# Patient Record
Sex: MALE | Race: BLACK OR AFRICAN AMERICAN | Employment: STUDENT | URBAN - METROPOLITAN AREA
[De-identification: names, ages, dates, MRNs, and addresses within clinical notes are randomized per-mention and may not be internally consistent; named-entity substitution may affect disease eponyms.]

---

## 2017-04-17 ENCOUNTER — HOSPITAL ENCOUNTER (EMERGENCY)
Age: 22
Discharge: HOME | End: 2017-04-17
Attending: EMERGENCY MEDICINE
Payer: COMMERCIAL

## 2017-04-17 VITALS
HEART RATE: 71 BPM | RESPIRATION RATE: 16 BRPM | DIASTOLIC BLOOD PRESSURE: 93 MMHG | HEIGHT: 72 IN | OXYGEN SATURATION: 96 % | SYSTOLIC BLOOD PRESSURE: 169 MMHG | TEMPERATURE: 98.8 F

## 2017-04-17 DIAGNOSIS — F32.89 OTHER DEPRESSION: Primary | ICD-10-CM

## 2017-04-17 LAB
AMPHET UR QL SCN: NEGATIVE
ANION GAP BLD CALC-SCNC: 13 MMOL/L (ref 5–15)
APPEARANCE UR: ABNORMAL
BACTERIA URNS QL MICRO: NEGATIVE /HPF
BARBITURATES UR QL SCN: NEGATIVE
BENZODIAZ UR QL: NEGATIVE
BILIRUB UR QL CFM: POSITIVE
BUN SERPL-MCNC: 16 MG/DL (ref 6–20)
BUN/CREAT SERPL: 13 (ref 12–20)
CALCIUM SERPL-MCNC: 9.8 MG/DL (ref 8.5–10.1)
CANNABINOIDS UR QL SCN: NEGATIVE
CHLORIDE SERPL-SCNC: 97 MMOL/L (ref 97–108)
CO2 SERPL-SCNC: 28 MMOL/L (ref 21–32)
COCAINE UR QL SCN: NEGATIVE
COLOR UR: ABNORMAL
CREAT SERPL-MCNC: 1.28 MG/DL (ref 0.7–1.3)
DRUG SCRN COMMENT,DRGCM: NORMAL
EPITH CASTS URNS QL MICRO: ABNORMAL /LPF
ERYTHROCYTE [DISTWIDTH] IN BLOOD BY AUTOMATED COUNT: 12.4 % (ref 11.5–14.5)
ETHANOL SERPL-MCNC: <10 MG/DL
GLUCOSE SERPL-MCNC: 103 MG/DL (ref 65–100)
GLUCOSE UR STRIP.AUTO-MCNC: NEGATIVE MG/DL
HCT VFR BLD AUTO: 47.7 % (ref 36.6–50.3)
HGB BLD-MCNC: 16.1 G/DL (ref 12.1–17)
HGB UR QL STRIP: NEGATIVE
KETONES UR QL STRIP.AUTO: 40 MG/DL
LEUKOCYTE ESTERASE UR QL STRIP.AUTO: NEGATIVE
MCH RBC QN AUTO: 29.8 PG (ref 26–34)
MCHC RBC AUTO-ENTMCNC: 33.8 G/DL (ref 30–36.5)
MCV RBC AUTO: 88.2 FL (ref 80–99)
METHADONE UR QL: NEGATIVE
NITRITE UR QL STRIP.AUTO: NEGATIVE
OPIATES UR QL: NEGATIVE
PCP UR QL: NEGATIVE
PH UR STRIP: 5.5 [PH] (ref 5–8)
PLATELET # BLD AUTO: 276 K/UL (ref 150–400)
POTASSIUM SERPL-SCNC: 4.3 MMOL/L (ref 3.5–5.1)
PROT UR STRIP-MCNC: ABNORMAL MG/DL
RBC # BLD AUTO: 5.41 M/UL (ref 4.1–5.7)
RBC #/AREA URNS HPF: ABNORMAL /HPF (ref 0–5)
SODIUM SERPL-SCNC: 138 MMOL/L (ref 136–145)
SP GR UR REFRACTOMETRY: 1.03 (ref 1–1.03)
UA: UC IF INDICATED,UAUC: ABNORMAL
UROBILINOGEN UR QL STRIP.AUTO: 1 EU/DL (ref 0.2–1)
WBC # BLD AUTO: 7.5 K/UL (ref 4.1–11.1)
WBC URNS QL MICRO: ABNORMAL /HPF (ref 0–4)

## 2017-04-17 PROCEDURE — 81001 URINALYSIS AUTO W/SCOPE: CPT | Performed by: EMERGENCY MEDICINE

## 2017-04-17 PROCEDURE — 80307 DRUG TEST PRSMV CHEM ANLYZR: CPT | Performed by: EMERGENCY MEDICINE

## 2017-04-17 PROCEDURE — 85027 COMPLETE CBC AUTOMATED: CPT | Performed by: EMERGENCY MEDICINE

## 2017-04-17 PROCEDURE — 80048 BASIC METABOLIC PNL TOTAL CA: CPT | Performed by: EMERGENCY MEDICINE

## 2017-04-17 PROCEDURE — 36415 COLL VENOUS BLD VENIPUNCTURE: CPT | Performed by: EMERGENCY MEDICINE

## 2017-04-17 PROCEDURE — 90791 PSYCH DIAGNOSTIC EVALUATION: CPT

## 2017-04-17 PROCEDURE — 99284 EMERGENCY DEPT VISIT MOD MDM: CPT

## 2017-04-17 PROCEDURE — 74011250637 HC RX REV CODE- 250/637: Performed by: EMERGENCY MEDICINE

## 2017-04-17 RX ORDER — DIAZEPAM 5 MG/1
5 TABLET ORAL
Qty: 12 TAB | Refills: 0 | Status: SHIPPED | OUTPATIENT
Start: 2017-04-17

## 2017-04-17 RX ORDER — DIAZEPAM 5 MG/1
5 TABLET ORAL
Status: COMPLETED | OUTPATIENT
Start: 2017-04-17 | End: 2017-04-17

## 2017-04-17 RX ADMIN — DIAZEPAM 5 MG: 5 TABLET ORAL at 05:59

## 2017-04-17 NOTE — ED PROVIDER NOTES
HPI Comments: Lee Morrissey is a 23 y.o. Male who presents via EMS to [facility] ED with cc of mental health problem. Patient reports he has not been able to sleep in the past couple of days. Per EMS, patient has been feeling depressed lately and would like to speak with someone. According to brother, patient has been acting strange for the past couple of days. His [place]. Per brother, he used a phone application to find out where the patient was today. Patient endorses a history of hospitalization for psychiatric reasons \"years ago. \" He denies taking any psychiatric medications. Patient specifically denies SI or HI. He denies any recent alcohol use. Social History: (-) Tobacco, (+) EtOH, (-) Illicit Drugs       There are no other complaints, changes, or physical findings at this time. Written by HADLEY Tse, as dictated by Ian Alfredo MD.       The history is provided by the patient, a relative and the EMS personnel. No  was used. History reviewed. No pertinent past medical history. History reviewed. No pertinent surgical history. History reviewed. No pertinent family history. Social History     Social History    Marital status: SINGLE     Spouse name: N/A    Number of children: N/A    Years of education: N/A     Occupational History    Not on file. Social History Main Topics    Smoking status: Not on file    Smokeless tobacco: Not on file    Alcohol use Not on file    Drug use: Not on file    Sexual activity: Not on file     Other Topics Concern    Not on file     Social History Narrative    No narrative on file         ALLERGIES: Review of patient's allergies indicates no known allergies. Review of Systems   Constitutional: Negative for appetite change, chills, diaphoresis, fatigue and fever. HENT: Negative for sore throat and trouble swallowing. Respiratory: Negative for cough and shortness of breath. Cardiovascular: Negative for chest pain. Gastrointestinal: Negative for abdominal pain, diarrhea, nausea and vomiting. Genitourinary: Negative for difficulty urinating, dysuria and frequency. Musculoskeletal: Negative for arthralgias, back pain and myalgias. Skin: Negative for color change and rash. Neurological: Negative for weakness and numbness. Hematological: Does not bruise/bleed easily. Psychiatric/Behavioral: Positive for sleep disturbance. Negative for suicidal ideas. Negative for HI   All other systems reviewed and are negative. Vitals:    04/17/17 0417   BP: (!) 176/103   Pulse: 71   Resp: 16   Temp: 98.8 °F (37.1 °C)   SpO2: 96%   Height: 6' (1.829 m)            Physical Exam   Constitutional: He is oriented to person, place, and time. He appears well-developed and well-nourished. No distress. HENT:   Head: Normocephalic and atraumatic. Mouth/Throat: Oropharynx is clear and moist. No oropharyngeal exudate or posterior oropharyngeal erythema. Neck: Normal range of motion and full passive range of motion without pain. Neck supple. Cardiovascular: Normal rate, regular rhythm, normal heart sounds, intact distal pulses and normal pulses. Exam reveals no gallop and no friction rub. No murmur heard. Pulmonary/Chest: Effort normal and breath sounds normal. No accessory muscle usage. No respiratory distress. He has no decreased breath sounds. He has no wheezes. He has no rhonchi. He has no rales. Abdominal: Soft. Bowel sounds are normal. He exhibits no distension. There is no tenderness. There is no rebound, no guarding and no CVA tenderness. Musculoskeletal: Normal range of motion. He exhibits no edema or tenderness. Thoracic back: He exhibits no tenderness and no bony tenderness. Lumbar back: He exhibits no tenderness and no bony tenderness. Lymphadenopathy:     He has no cervical adenopathy. Neurological: He is alert and oriented to person, place, and time. He has normal strength. He is not disoriented. No cranial nerve deficit or sensory deficit. No focal deficits; 5/5 muscle strength in all extremities   Skin: Skin is warm. No lesion and no rash noted. Rash is not nodular. He is not diaphoretic. Psychiatric:   Mood: Sad   Nursing note and vitals reviewed. MDM  Number of Diagnoses or Management Options  Diagnosis management comments: DDx: depression, stress       Amount and/or Complexity of Data Reviewed  Clinical lab tests: ordered and reviewed  Obtain history from someone other than the patient: yes (Brother, EMS)  Review and summarize past medical records: yes  Discuss the patient with other providers: yes [facility])    Patient Progress  Patient progress: stable    ED Course       Procedures    CONSULT NOTE:   5:03 AM  Tyrel Ortiz MD spoke with Janett Polk,   Specialty: [facility]  Discussed pt's hx, disposition, and available diagnostic and imaging results. Reviewed care plans. Consultant agrees with plans as outlined. She will speak with the psychiatrist.   Written by Jobie Holter, ED Scribe, as dictated by Tyrel Ortiz MD.    PROGRESS NOTE  5:35 AM   Spoke with Janett Polk from [facility]. She spoke with psychiatry who does not recommend admission at this time.    Written by Jobie Holter, ED Scribe, as dictated by Tyrel Ortiz MD.      LABORATORY TESTS:  Recent Results (from the past 12 hour(s))   URINALYSIS W/ REFLEX CULTURE    Collection Time: 04/17/17  4:48 AM   Result Value Ref Range    Color DARK YELLOW      Appearance CLOUDY (A) CLEAR      Specific gravity 1.030 1.003 - 1.030      pH (UA) 5.5 5.0 - 8.0      Protein TRACE (A) NEG mg/dL    Glucose NEGATIVE  NEG mg/dL    Ketone 40 (A) NEG mg/dL    Blood NEGATIVE  NEG      Urobilinogen 1.0 0.2 - 1.0 EU/dL    Nitrites NEGATIVE  NEG      Leukocyte Esterase NEGATIVE  NEG      WBC 0-4 0 - 4 /hpf    RBC 0-5 0 - 5 /hpf Epithelial cells FEW FEW /lpf    Bacteria NEGATIVE  NEG /hpf    UA:UC IF INDICATED CULTURE NOT INDICATED BY UA RESULT CNI     DRUG SCREEN, URINE    Collection Time: 04/17/17  4:48 AM   Result Value Ref Range    AMPHETAMINE NEGATIVE  NEG      BARBITURATES NEGATIVE  NEG      BENZODIAZEPINE NEGATIVE  NEG      COCAINE NEGATIVE  NEG      METHADONE NEGATIVE  NEG      OPIATES NEGATIVE  NEG      PCP(PHENCYCLIDINE) NEGATIVE  NEG      THC (TH-CANNABINOL) NEGATIVE  NEG      Drug screen comment (NOTE)    METABOLIC PANEL, BASIC    Collection Time: 04/17/17  4:48 AM   Result Value Ref Range    Sodium 138 136 - 145 mmol/L    Potassium 4.3 3.5 - 5.1 mmol/L    Chloride 97 97 - 108 mmol/L    CO2 28 21 - 32 mmol/L    Anion gap 13 5 - 15 mmol/L    Glucose 103 (H) 65 - 100 mg/dL    BUN 16 6 - 20 MG/DL    Creatinine 1.28 0.70 - 1.30 MG/DL    BUN/Creatinine ratio 13 12 - 20      GFR est AA >60 >60 ml/min/1.73m2    GFR est non-AA >60 >60 ml/min/1.73m2    Calcium 9.8 8.5 - 10.1 MG/DL   CBC W/O DIFF    Collection Time: 04/17/17  4:48 AM   Result Value Ref Range    WBC 7.5 4.1 - 11.1 K/uL    RBC 5.41 4.10 - 5.70 M/uL    HGB 16.1 12.1 - 17.0 g/dL    HCT 47.7 36.6 - 50.3 %    MCV 88.2 80.0 - 99.0 FL    MCH 29.8 26.0 - 34.0 PG    MCHC 33.8 30.0 - 36.5 g/dL    RDW 12.4 11.5 - 14.5 %    PLATELET 524 533 - 276 K/uL   ETHYL ALCOHOL    Collection Time: 04/17/17  4:48 AM   Result Value Ref Range    ALCOHOL(ETHYL),SERUM <10 <10 MG/DL   BILIRUBIN, CONFIRM    Collection Time: 04/17/17  4:48 AM   Result Value Ref Range    Bilirubin UA, confirm POSITIVE (A) NEG         IMPRESSION:  1. Other depression        PLAN:  1. There are no discharge medications for this patient.     2.   Follow-up Information     Follow up With Details Comments Gareth Garcia MD   [place] [id number] [phone]      Chloe Pierce MD   [address] [place] [address] and [address] [place] [address].  [phone]            Return to ED if worse Discharge Note:  5:37 AM  The patient has been re-evaluated and is ready for discharge. Reviewed available results with patient. Counseled patient on diagnosis and care plan. Patient has expressed understanding, and all questions have been answered. Patient agrees with plan and agrees to follow up as recommended, or to return to the ED if their symptoms worsen. Discharge instructions have been provided and explained to the patient, along with reasons to return to the ED. Written by Homar Nuñez, ED Scribe, as dictated by Tayler Smith MD.    This note is prepared by Homar Nuñez, acting as Scribe for Tayler Smith MD.    Tayler Smith MD: The scribe's documentation has been prepared under my direction and personally reviewed by me in its entirety. I confirm that the note above accurately reflects all work, treatment, procedures, and medical decision making performed by me.

## 2017-04-17 NOTE — ED NOTES
I informed the patient and his brother he was getting a dose of valium to help him sleep. Patient's mother was on his brother's cell phone asking to speak to the doctor. I informed the charge nurse of the situation. The charge nurse informed the patient and his brother that the patient is discharged and needs to follow up with [facility]) today at 0800. The charge nurse also spoke with the patient's mother via cell phone and informed her of the plan for the patient to be discharged and follow up with [facility] today at 0800.

## 2017-04-17 NOTE — ED NOTES
Patient's brother demanded that the patient stay here to sleep. Patient's brother stated \" He has not slept in days. I want the doctor to give him something and he sleep here. Once he wakes up we can talk about it. My parents are on the way\". I informed the patient's brother the patient was discharged. I asked the doctor if the patient could have something to help him sleep. No verbal orders received.

## 2017-04-17 NOTE — ED NOTES
Brother to ED door at this time requesting \" to talk to someone\". Patient's brother relayed at this time \" I really need to talk to someone about him sleeping. My mother is coming up here and you would be better off talking to me because she can be very forceful. He hasn't slept in 4 days. He took a Unasom yesterday and now you have given him that valium but if he doesn't sleep he will be up for 5 days. Relayed to brother that patient is unable to \"just come back there any sleep\" as he requested. Relayed to brother to take brother home and have him follow-up with [facility] this am regarding mental health issues as outpatient. Brother verbalized understanding.

## 2017-04-17 NOTE — ED NOTES
Mother on phone at this time insisting that patient\" needs to stay there, he hasn't been asleep in several days and he needs to go to sleep\". Relayed to mother patient has been assessed by [facility], instructed to follow-up with [facility] at 0800 am this am. Mother continues to insist that patient \"stay back there and sleep\". Again relayed instructions that patient is discharged, will be waiting in lobby for pickup. Mother states \" I'll talk with y'all when I come back\".

## 2017-04-17 NOTE — BSMART NOTE
Comprehensive Assessment Form Part 1      Section I - Disposition    Axis I - Depressive Disorder NOS   Axis II - Deferred  Axis III - None  Axis IV - Socioeconomic Stressors  Axis V -       The Medical Doctor to Psychiatrist conference was not completed. The Medical Doctor is in agreement with Psychiatrist disposition because of (reason) patient does not meet criteria for inpatient hospitalization. The plan is discharge patient with resources to connect with [facility] and [facility]. The on-call Psychiatrist consulted was Dr. Michelle Gutierrez. The admitting Psychiatrist will be Dr. Aurelia Mcgowan. The admitting Diagnosis is none. The Payor source is self-[facility]. Section II - Integrated Summary  Summary:  Patient is 23year old male reported to ED Patient denies suicidal and homicidal ideation. Patient says he has not been able to sleep for a \"few days\". Assessment was completed via tele psych. Patient reported having memory problems mostly short term and recalling things. Patient continues to deny suicidal and homicidal ideations. Patient reported having roommates and having good relationships with them. Patient verbalized having support. Patient did not identify any stressors or changes that could have caused recent changes. Patient reported having difficulty sleeping. Patient gave poor eye contact and was guarded during assessment. Patient denied being previously hospitalized. The patient has demonstrated mental capacity to provide informed consent. The information is given by the patient. The Chief Complaint is memory problems, difficulty sleeping. The Precipitant Factors are none reported. Previous Hospitalizations: no  The patient has not previously been in restraints. Current Psychiatrist and/or  is none. Lethality Assessment:    The potential for suicide noted by the following: not noted . The potential for homicide is not noted.   The patient has not been a perpetrator of sexual or physical abuse.  There are not pending charges. The patient is not felt to be at risk for self harm or harm to others. The attending nurse was advised not noted. Section III - Psychosocial  The patient's overall mood and attitude is guarded, calm, low mood. Feelings of helplessness and hopelessness are not observed. Generalized anxiety is not observed. Panic is not observed. Phobias are not observed. Obsessive compulsive tendencies are not observed. Section IV - Mental Status Exam  The patient's appearance shows no evidence of impairment. The patient's behavior is guarded and shows poor eye contact. The patient is oriented to time, place, person and situation. The patient's speech shows no evidence of impairment. The patient's mood is depressed. The range of affect is flat. The patient's thought content demonstrates no evidence of impairment. The thought process shows no evidence of impairment. The patient's perception shows no evidence of impairment. The patient's memory shows no evidence of impairment. The patient's appetite shows no evidence of impairment. The patient's sleep shows no evidence of impairment. The patient's insight shows no evidence of impairment. The patient's judgement shows no evidence of impairment. Section V - Substance Abuse  The patient is not using substances. The patient is using not noted. The patient has experienced the following withdrawal symptoms: N/A. Section VI - Living Arrangements  The patient is single. The patient lives roommate. The patient has no children. The patient does plan to return home upon discharge. The patient does not have legal issues pending. The patient's source of income comes from employment. Orthodox and cultural practices have not been voiced at this time. The patient's greatest support comes from family and friends and this person will not be involved with the treatment.     The patient has not been in an event described as horrible or outside the realm of ordinary life experience either currently or in the past.  The patient has not been a victim of sexual/physical abuse. Section VII - Other Areas of Clinical Concern  The highest grade achieved is 12 th grade/ some college with the overall quality of school experience being described as good. The patient is currently employed and speaks [place] as a primary language. The patient has no communication impairments affecting communication. The patient's preference for learning can be described as: can read and write adequately.   The patient's hearing is normal.  The patient's vision is normal.      [facility] Form

## 2017-04-17 NOTE — ED NOTES
Patient presented to the ED today for complaints of mental health problem. Patient says he has not been able to sleep for a few days. Patient has delayed responses to questions. Patient stares off in a daze during conversing. Patient says symptoms started a few days ago. Respirations are even and unlabored. Skin is dry,warm, and intact.

## 2017-04-17 NOTE — ED NOTES
Patient educated on discharge instructions. Patient verbalized understanding of eduction. Patient given discharge instructions . Patient ambulated out of ED with his brother. Patient and his brother told to follow up with [facility] today at 0800. No acute distress noted. Emergency Department Nursing Plan of Care       The Nursing Plan of Care is developed from the Nursing assessment and Emergency Department Attending provider initial evaluation. The plan of care may be reviewed in the ED Provider note.     The Plan of Care was developed with the following considerations:   Patient / Family readiness to learn indicated by:verbalized understanding  Persons(s) to be included in education: patient and family  Barriers to Learning/Limitations:No    Signed     Patric Stern RN    4/17/2017   6:22 AM    '